# Patient Record
Sex: FEMALE | Race: WHITE
[De-identification: names, ages, dates, MRNs, and addresses within clinical notes are randomized per-mention and may not be internally consistent; named-entity substitution may affect disease eponyms.]

---

## 2017-05-15 ENCOUNTER — HOSPITAL ENCOUNTER (OUTPATIENT)
Dept: HOSPITAL 80 - FIMAGING | Age: 37
End: 2017-05-15
Attending: OBSTETRICS & GYNECOLOGY
Payer: COMMERCIAL

## 2017-05-15 DIAGNOSIS — O09.522: Primary | ICD-10-CM

## 2017-05-15 DIAGNOSIS — Z3A.20: ICD-10-CM

## 2017-09-28 NOTE — OBDEL
Birth Info


Birth Type: Vaginal


Presentation at Delivery: Vertex


L&D Analgesia/Anesthesia Type: None


GBS+: No





- Infant Care Provider


Pediatrician/NNP: Rahel Love





- Hospital Course


Intrapartum: 





17 06:54


spontaneous labor- unmedicated


SROM- moderate meconium


1st degree laceration


Indications for Delivery: Spontaneous Labor, SROM





Vaginal Delivery





- Delivery Provider


Delivery Physician/CNM: Laura Galvin





- Labor and Delivery


Onset of Contractions Date: 17


Onset of Contractions Time: 22:00


Onset of Contractions Type: Spontaneous


Rupture of Membranes Date: 17


Rupture of Membranes Time: 05:25


Rupture of Membranes Type: Spontaneous


Amniotic Fluid Color: Thick Meconium


Dilation Complete Date: 17


Dilation Complete Time: 05:45


Placenta Delivery Date: 17


Placenta Delivery Time: 05:58


Total Hours of Labor: 7


Laceration: 1st Degree


Repair: 3-0, Vicryl


Vaginal Sponge Count Correct: Yes


Vaginal Needle Count Correct: Yes


Vaginal Sweep Performed: Yes


EBL: 250


Delivery Events: None





[facility] Birth Data


  ** Yang


Delivery Date: 17


Delivery Time: 05:47


OMERO: 17


Gestational Age: 40 week(s) and 1 day(s)


Sex of Infant: Male


Apgar Score (1 Min): 7





ICD10 Worksheet


Patient Problems: 


 Problems











Problem Status Onset


 


 (spontaneous vaginal delivery) Acute  


 


First degree perineal laceration Acute  


 


Thick meconium stained amniotic fluid Acute  














- ICD10 Problem Qualifiers


(1) Thick meconium stained amniotic fluid








(2) First degree perineal laceration

## 2017-09-29 NOTE — OBGCSDC
General Delivery Information





- General Info


: 3


Para: 2


Abortions: 1


Birth Type: Vaginal


L&D Analgesia/Anesthesia Type: None


Admission Date: 17


Labs: 





 











Patient ABO/Rh  A POSITIVE   17  06:05    


 


Hct  38.9 % (38.0-47.0)   17  06:05    














- Hospital Course


Intrapartum: 





17 06:54


spontaneous labor- unmedicated


SROM- moderate meconium


1st degree laceration


Postpartum: 





17 11:55


PPD#1 doing well. Desires DC home today. She denies significant pain or lochia. 

She is breastfeeding.





Vaginal Birth





- Delivery Provider


Delivery Physician/CNM: Laura Galvin





- Diagnosis


Labor: Spontaneous


Rupture of Membranes Type: Spontaneous


Amniotic Fluid Color: Thick Meconium


Laceration: 1st Degree


Repair: 3-0, Vicryl


Delivery Events: None





 Birth





-  Delivery


EBL: 250





 Birth Data


  ** Yang


Delivery Date: 17


Delivery Time: 05:47


OMERO: 17


Gestational Age: 40 week(s) and 2 day(s)


Sex of Infant: Male


Candor Weight (gm): 3518 g


Apgar Score (1 Min): 7


Apgar Score (5 Min): 8





Discharge Information





- Discharge Information


Condition: Good


Instruction/Follow Up: Four Weeks, Six Weeks